# Patient Record
(demographics unavailable — no encounter records)

---

## 2025-04-08 NOTE — HISTORY OF PRESENT ILLNESS
[FreeTextEntry1] :  patient here for yearly physical exam [de-identified] :  patient here for yearly physical exam [FreeTextEntry8] : here c/o body aches, sore throat, hot flushes, fatigue, myalgias, coughing, rhinorrhea x 3 days. possible covid exposure 5 days ago.

## 2025-04-08 NOTE — HISTORY OF PRESENT ILLNESS
[FreeTextEntry1] :  patient here for yearly physical exam [de-identified] :  patient here for yearly physical exam [FreeTextEntry8] : here c/o body aches, sore throat, hot flushes, fatigue, myalgias, coughing, rhinorrhea x 3 days. possible covid exposure 5 days ago.

## 2025-04-08 NOTE — HISTORY OF PRESENT ILLNESS
[FreeTextEntry1] :  patient here for yearly physical exam [de-identified] :  patient here for yearly physical exam [FreeTextEntry8] : here c/o body aches, sore throat, hot flushes, fatigue, myalgias, coughing, rhinorrhea x 3 days. possible covid exposure 5 days ago.

## 2025-07-03 NOTE — ASSESSMENT
[FreeTextEntry1] : #Multiple benign nevi - chronic, stable - I discussed the chronic nature and course of the condition - Photoprotection discussed, recommend daily broad-spectrum sunscreen, SPF 30 or greater, UPF hat, clothing. - Pt educated on ABCDE of melanoma - Recommend self-skin exam and annual skin exam by MD - Pt instructed to return for new or changing lesions especially if any moles start to change, itch, or bleed  # Seborrheic keratosis, trunk/extremities   -chronic, stable -I discussed the chronic nature and course of the condition -counseled on benign nature -no treatment needed unless symptomatic  #Onychodystrophy ddx includes onychomycosis rtc for nail clipping and repeat terbinafine if positive discussed ciclopirox lacquer  #Dermatitis - beard area favor acneiform eruption ddx includes seb derm, eczema -start clindamycin 1% lotion bid to affected areas prn flare pt to contact me if rash not improving  RTC 1 year for TBSE, sooner PRN

## 2025-07-03 NOTE — HISTORY OF PRESENT ILLNESS
[FreeTextEntry1] : NPA- TBSE, Rash, Onychomycosis  [de-identified] : Gabriel Ron 31 y/o M presents for TBSE and rash rash on face present for one month, using new earphones, but not sure if this has to do with rash. no prior tx.  onychomycosis of fingernails dx 3 years ago, used terbinafine x 3 months 3 years ago, helped but recurred.   Personal history of skin cancer: no Family history of skin cancer: mom and dad, BCC, SCC  History of blistering sunburns: yes History of tanning bed use: no Uses sunscreen regularly: yes  anesthesia resident at [facility]

## 2025-07-03 NOTE — PHYSICAL EXAM
[FreeTextEntry3] : PE:   General: well-appearing, alert, in no acute distress Full body skin exam performed examining scalp, head, face, ears, neck, chest, back, abdomen, axilla, b/l arms, b/l forearms, b/l hands, b/l fingernails, b/l thighs, b/l legs, b/l feet, b/l toenails Pertinent findings include: -examination of the groin/genitals/buttocks deferred -scattered light brown to dark brown colored <6mm papules and macules on the trunk and extremities -brown stuck on papules, plaques on the trunk and extremities -one fingernails with distal onycholysis pink papules on the beard